# Patient Record
Sex: FEMALE | Race: WHITE
[De-identification: names, ages, dates, MRNs, and addresses within clinical notes are randomized per-mention and may not be internally consistent; named-entity substitution may affect disease eponyms.]

---

## 2017-01-27 ENCOUNTER — HOSPITAL ENCOUNTER (OUTPATIENT)
Dept: HOSPITAL 53 - M WHC | Age: 72
End: 2017-01-27
Attending: NURSE PRACTITIONER
Payer: MEDICARE

## 2017-01-27 DIAGNOSIS — Z12.31: Primary | ICD-10-CM

## 2017-01-27 NOTE — REPMRS
Patient History

The patient states she had a clinical breast exam in 

Patient is postmenopausal and has history of ovarian cancer at 

age 69.

No known family history of cancer.

Benign excisional biopsy of the left breast, 1982.

Took estrogen for 1 year.

 

Digital Woman Screen Mammo: January 27, 2017 - Exam #: 

[id number]

Bilateral CC and MLO view(s) were taken.

 

Technologist: Zita Barlow, Technologist

Prior study comparison: January 20, 2016, digital woman screen 

mammo performed at [facility].  January 19, 2015, 

digital woman screen mammo performed at [facility].

December 5, 2013, digital woman screen mammo performed at 

[facility].

 

FINDINGS: There are scattered fibroglandular densities.  There 

has been no change in the appearance of the mammogram from the 

prior studies.  There is a mild amount of scattered 

fibroglandular density which is fairly symmetric. There is no 

interval development of dominant mass, architectural distortion, 

or clustered microcalcification suggestive of malignancy.

 

ASSESSMENT: BI-RADS/ACR category 1 mammogram. Negative.

 

Recommendation

Routine screening mammogram in 1 year (for women over age 40).

This mammogram was interpreted with the aid of an FDA-approved 

computer-aided dectection system.

 

Electronically Signed By: Christiano Post MD 01/27/17 [id number]

## 2017-03-13 ENCOUNTER — HOSPITAL ENCOUNTER (OUTPATIENT)
Dept: HOSPITAL 53 - M LAB | Age: 72
End: 2017-03-13
Attending: OBSTETRICS & GYNECOLOGY
Payer: MEDICARE

## 2017-03-13 DIAGNOSIS — C56.1: Primary | ICD-10-CM

## 2017-05-17 ENCOUNTER — HOSPITAL ENCOUNTER (OUTPATIENT)
Dept: HOSPITAL 53 - M LAB REF | Age: 72
End: 2017-05-17
Attending: INTERNAL MEDICINE
Payer: MEDICARE

## 2017-05-17 DIAGNOSIS — D50.9: Primary | ICD-10-CM

## 2017-05-18 LAB
FERRITIN SERPL-MCNC: 33 NG/ML (ref 8–252)
IRON SATN MFR SERPL: 13.4 % (ref 13.2–37.4)
TIBC SERPL-MCNC: 373 UG/DL (ref 250–450)

## 2017-06-09 ENCOUNTER — HOSPITAL ENCOUNTER (OUTPATIENT)
Dept: HOSPITAL 53 - M LAB | Age: 72
End: 2017-06-09
Attending: PHYSICIAN ASSISTANT
Payer: MEDICARE

## 2017-06-09 DIAGNOSIS — C56.1: Primary | ICD-10-CM

## 2017-06-28 ENCOUNTER — HOSPITAL ENCOUNTER (EMERGENCY)
Dept: HOSPITAL 53 - M ED | Age: 72
LOS: 1 days | Discharge: HOME | End: 2017-06-29
Payer: MEDICARE

## 2017-06-28 VITALS — DIASTOLIC BLOOD PRESSURE: 72 MMHG | SYSTOLIC BLOOD PRESSURE: 142 MMHG

## 2017-06-28 VITALS — WEIGHT: 168.34 LBS | HEIGHT: 68 IN | BODY MASS INDEX: 25.51 KG/M2
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
## 2017-06-28 DIAGNOSIS — S00.83XA: Primary | ICD-10-CM

## 2017-06-28 DIAGNOSIS — Z88.8: ICD-10-CM

## 2017-06-28 DIAGNOSIS — Z85.43: ICD-10-CM

## 2017-06-28 DIAGNOSIS — M51.36: ICD-10-CM
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
## 2017-06-28 DIAGNOSIS — Z79.899: ICD-10-CM

## 2017-06-28 DIAGNOSIS — S33.5XXA: ICD-10-CM

## 2017-06-28 DIAGNOSIS — M43.16: ICD-10-CM

## 2017-06-28 DIAGNOSIS — M47.817: ICD-10-CM

## 2017-06-28 DIAGNOSIS — S23.3XXA: ICD-10-CM

## 2017-06-28 DIAGNOSIS — I10: ICD-10-CM
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
## 2017-06-28 DIAGNOSIS — Z88.5: ICD-10-CM

## 2017-06-28 DIAGNOSIS — Z90.79: ICD-10-CM

## 2017-06-28 NOTE — REPUSA
CT of the facial bones without contrast

Clinical history: Pain, injury.

Technique: Multiple axial CT images were obtained through the facial bones and paranasal sinuses util
izing 3 mm axial slices without administration of contrast. Coronal and sagittal reconstructions were
 also obtained.

Findings: There are  postoperative changes in the right osteomeatal complex and right maxillary sinus
.. Chronic mucosal changes are seen in this region.. The other paranasal sinuses are grossly unremark
able. The nasal septum is deviated leftward. The visualized mastoid air cells are clear. The osseous 
structures do not demonstrate any acute abnormalities. The superficial soft tissues are within normal
 limits.

Impression: Chronic postoperative changes in the right maxillary sinus.  No acute fractures or trauma
tic injury appreciated.

     Electronically signed by HARRY LANTIGUA MD on 06/28/2017 11:44:39 PM ET

## 2017-06-28 NOTE — REPUSA
CT of the thoracic spine without contrast

Clinical history: Pain.

Technique: Multiple axial CT images were obtained through the thoracic spine without administration o
f contrast. Coronal and sagittal 3-D reconstructed images were also obtained.

Findings:

The vertebral bodies are in satisfactory positioning and alignment. No fractures or dislocations are 
demonstrated. Intervertebral disc spaces are well-maintained. There is no evidence of facet subluxati
on. The neural foramen appear grossly patent. The spinal canal demonstrates normal caliber and contou
r without evidence of spinal stenosis. The surrounding soft tissues are within normal limits.

Impression: No acute traumatic injury.

     Electronically signed by HARRY LANTIGUA MD on 06/28/2017 11:45:28 PM ET

## 2017-06-28 NOTE — REPUSA
CT of the lumbar spine without contrast

Clinical history: Pain.

Technique: Multiple axial CT images were obtained through the lumbar spine without administration of 
contrast. Coronal and sagittal 3-D reconstructed images were also obtained.

Findings:

The lumbar vertebral bodies are in satisfactory positioning.. There is a 5 mm anterior spondylolisthe
sis of L4 upon L5. Severe degenerative disc disease is noted at L3/L4 and L4/L5. No fractures or disl
ocations are demonstrated. There is no evidence of facet subluxation. Moderate facet arthropathy is s
een bilaterally, most severe at L5/S1. The neural foramen appear grossly patent. The spinal canal dem
onstrates normal caliber and contour without evidence of spinal stenosis. The surrounding soft tissue
s are within normal limits.

Impression:

1. No acute fracture or traumatic injury.

2. Moderately severe degenerative disc disease at L3/L4 and L4/L5.

3. Grade 1anterior spondylolisthesis of L4 upon L5.

4. Moderate facet arthropathy, most severe at L5/S1.

     Electronically signed by HARRY LANTIGUA MD on 06/28/2017 11:47:09 PM ET

## 2017-09-13 ENCOUNTER — HOSPITAL ENCOUNTER (OUTPATIENT)
Dept: HOSPITAL 53 - M LAB | Age: 72
End: 2017-09-13
Attending: PHYSICIAN ASSISTANT
Payer: MEDICARE

## 2017-09-13 DIAGNOSIS — C56.1: Primary | ICD-10-CM

## 2017-10-06 ENCOUNTER — HOSPITAL ENCOUNTER (OUTPATIENT)
Dept: HOSPITAL 53 - M LABNEURO | Age: 72
End: 2017-10-06
Attending: PSYCHIATRY & NEUROLOGY
Payer: MEDICARE

## 2017-10-06 DIAGNOSIS — I10: ICD-10-CM

## 2017-10-06 DIAGNOSIS — E11.9: Primary | ICD-10-CM

## 2017-10-06 LAB
BUN SERPL-MCNC: 23 MG/DL (ref 7–18)
CREAT SERPL-MCNC: 0.72 MG/DL (ref 0.55–1.02)
GFR SERPL CREATININE-BSD FRML MDRD: > 60 ML/MIN/{1.73_M2} (ref 39–?)

## 2018-01-26 ENCOUNTER — HOSPITAL ENCOUNTER (OUTPATIENT)
Dept: HOSPITAL 53 - M WHC | Age: 73
End: 2018-01-26
Attending: NURSE PRACTITIONER
Payer: MEDICARE

## 2018-01-26 DIAGNOSIS — Z12.31: Primary | ICD-10-CM

## 2018-01-26 PROCEDURE — 77067 SCR MAMMO BI INCL CAD: CPT

## 2018-02-16 ENCOUNTER — HOSPITAL ENCOUNTER (OUTPATIENT)
Dept: HOSPITAL 53 - M LAB | Age: 73
End: 2018-02-16
Attending: ORTHOPAEDIC SURGERY
Payer: MEDICARE

## 2018-02-16 DIAGNOSIS — Z86.39: ICD-10-CM

## 2018-02-16 DIAGNOSIS — M17.11: ICD-10-CM

## 2018-02-16 DIAGNOSIS — D63.8: ICD-10-CM

## 2018-02-16 DIAGNOSIS — Z01.812: Primary | ICD-10-CM

## 2018-02-16 DIAGNOSIS — M25.561: ICD-10-CM

## 2018-02-16 LAB
ALBUMIN: 3.4 GM/DL (ref 3.2–5.2)
BASO #: 0 10^3/UL (ref 0–0.2)
BASO %: 0.7 % (ref 0–1)
EOS #: 0.3 10^3/UL (ref 0–0.5)
EOSINOPHIL NFR BLD AUTO: 5.6 % (ref 0–3)
EST. AVERAGE GLUCOSE BLD GHB EST-MCNC: 117 MG/DL (ref 60–110)
FERRITIN: 34 NG/ML (ref 8–252)
HEMATOCRIT: 35.4 % (ref 36–47)
HEMOGLOBIN: 11.3 G/DL (ref 12–16)
IMMATURE GRANULOCYTE %: 0.3 % (ref 0–3)
IRON (FE): 59 UG/DL (ref 50–170)
IRON SATN MFR SERPL: 18.9 % (ref 13.2–45)
LYMPH #: 1.5 10^3/UL (ref 1.5–4.5)
LYMPH %: 24.7 % (ref 24–44)
MEAN CORPUSCULAR HEMOGLOBIN: 29.9 PG (ref 27–33)
MEAN CORPUSCULAR HGB CONC: 31.9 G/DL (ref 32–36.5)
MEAN CORPUSCULAR VOLUME: 93.7 FL (ref 80–96)
MONO #: 0.5 10^3/UL (ref 0–0.8)
MONO %: 7.7 % (ref 0–5)
NEUTROPHILS #: 3.7 10^3/UL (ref 1.8–7.7)
NEUTROPHILS %: 61 % (ref 36–66)
NRBC BLD AUTO-RTO: 0 % (ref 0–0)
PLATELET COUNT, AUTOMATED: 241 10^3/UL (ref 150–450)
RED BLOOD COUNT: 3.78 10^6/UL (ref 4–5.4)
RED CELL DISTRIBUTION WIDTH: 12.9 % (ref 11.5–14.5)
TOTAL IRON BINDING CAPACITY: 312 UG/DL (ref 250–450)
WHITE BLOOD COUNT: 6.1 10^3/UL (ref 4–10)

## 2018-02-16 PROCEDURE — 82040 ASSAY OF SERUM ALBUMIN: CPT

## 2018-03-05 ENCOUNTER — HOSPITAL ENCOUNTER (EMERGENCY)
Dept: HOSPITAL 53 - M ED | Age: 73
Discharge: HOME | End: 2018-03-05
Payer: MEDICARE

## 2018-03-05 DIAGNOSIS — Z79.899: ICD-10-CM
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
## 2018-03-05 DIAGNOSIS — F33.9: ICD-10-CM
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
## 2018-03-05 DIAGNOSIS — I10: ICD-10-CM

## 2018-03-05 DIAGNOSIS — Z88.5: ICD-10-CM

## 2018-03-05 DIAGNOSIS — S52.002A: Primary | ICD-10-CM

## 2018-03-05 DIAGNOSIS — Z79.84: ICD-10-CM

## 2018-03-05 DIAGNOSIS — J45.909: ICD-10-CM

## 2018-03-05 DIAGNOSIS — Z88.8: ICD-10-CM

## 2018-03-05 PROCEDURE — 73080 X-RAY EXAM OF ELBOW: CPT

## 2018-03-20 ENCOUNTER — HOSPITAL ENCOUNTER (OUTPATIENT)
Dept: HOSPITAL 53 - M LAB REF | Age: 73
End: 2018-03-20
Attending: ORTHOPAEDIC SURGERY
Payer: MEDICARE

## 2018-03-20 DIAGNOSIS — T81.4XXA: Primary | ICD-10-CM

## 2018-03-20 PROCEDURE — 87116 MYCOBACTERIA CULTURE: CPT

## 2018-04-13 ENCOUNTER — HOSPITAL ENCOUNTER (OUTPATIENT)
Dept: HOSPITAL 53 - M LABDRAW1 | Age: 73
End: 2018-04-13
Attending: ORTHOPAEDIC SURGERY
Payer: MEDICARE

## 2018-04-13 ENCOUNTER — HOSPITAL ENCOUNTER (OUTPATIENT)
Dept: HOSPITAL 53 - M LABDRAW1 | Age: 73
End: 2018-04-13
Attending: PHYSICIAN ASSISTANT
Payer: MEDICARE
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
## 2018-04-13 DIAGNOSIS — S52.222D: Primary | ICD-10-CM
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
## 2018-04-13 DIAGNOSIS — Z01.812: ICD-10-CM
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
## 2018-04-13 LAB
ANION GAP: 7 MEQ/L (ref 8–16)
BASO #: 0.1 10^3/UL (ref 0–0.2)
BASO %: 0.7 % (ref 0–1)
BLOOD UREA NITROGEN: 21 MG/DL (ref 7–18)
CALCIUM LEVEL: 8.9 MG/DL (ref 8.8–10.2)
CARBON DIOXIDE LEVEL: 29 MEQ/L (ref 21–32)
CHLORIDE LEVEL: 107 MEQ/L (ref 98–107)
CREATININE FOR GFR: 0.87 MG/DL (ref 0.55–1.3)
CRP SERPL-MCNC: 0.37 MG/DL (ref 0–0.3)
EOS #: 0.2 10^3/UL (ref 0–0.5)
EOSINOPHIL NFR BLD AUTO: 2 % (ref 0–3)
ERYTHROCYTE SEDIMENTATION RATE: 25 MM/HR (ref 0–30)
GFR SERPL CREATININE-BSD FRML MDRD: > 60 ML/MIN/{1.73_M2} (ref 39–?)
GLUCOSE, FASTING: 115 MG/DL (ref 70–100)
HEMATOCRIT: 40.5 % (ref 36–47)
HEMOGLOBIN: 13.2 G/DL (ref 12–15.5)
IMMATURE GRANULOCYTE %: 0.3 % (ref 0–3)
LYMPH #: 1.6 10^3/UL (ref 1.5–4.5)
LYMPH %: 18.2 % (ref 24–44)
MEAN CORPUSCULAR HEMOGLOBIN: 30.1 PG (ref 27–33)
MEAN CORPUSCULAR HGB CONC: 32.6 G/DL (ref 32–36.5)
MEAN CORPUSCULAR VOLUME: 92.3 FL (ref 80–96)
MONO #: 0.7 10^3/UL (ref 0–0.8)
MONO %: 7.6 % (ref 0–5)
NEUTROPHILS #: 6.4 10^3/UL (ref 1.8–7.7)
NEUTROPHILS %: 71.2 % (ref 36–66)
NRBC BLD AUTO-RTO: 0 % (ref 0–0)
PLATELET COUNT, AUTOMATED: 327 10^3/UL (ref 150–450)
POTASSIUM SERUM: 3.9 MEQ/L (ref 3.5–5.1)
RED BLOOD COUNT: 4.39 10^6/UL (ref 4–5.4)
RED CELL DISTRIBUTION WIDTH: 12.5 % (ref 11.5–14.5)
SODIUM LEVEL: 143 MEQ/L (ref 136–145)
WHITE BLOOD COUNT: 9 10^3/UL (ref 4–10)

## 2018-04-13 PROCEDURE — 80048 BASIC METABOLIC PNL TOTAL CA: CPT

## 2018-04-13 PROCEDURE — 86140 C-REACTIVE PROTEIN: CPT

## 2019-01-04 NOTE — REPMRS
Patient History

The patient states she had a clinical breast exam in 07/2018.

Patient is postmenopausal, has history of ovarian cancer at age 

69, and had previous chemotherapy at age 69.

No known family history of cancer.

Benign excisional biopsy of the left breast, 1982.

Taking estrogen for 3 years.

 

Digital Woman Screen Mammo: January 4, 2019 - Exam #: 

[id number]

Bilateral CC and MLO view(s) were taken.

 

Technologist: Charu Bolden, Technologist

Prior study comparison: January 26, 2018, digital woman screen 

mammo performed at [facility].  January 27, 2017, 

digital woman screen mammo performed at [facility].

January 20, 2016, digital woman screen mammo performed at 

[facility].

 

FINDINGS: There are scattered fibroglandular densities.  There 

has been no change in the appearance of the mammogram from the 

prior studies.  There is a mild amount of scattered 

fibroglandular density which is fairly symmetric. There is no 

interval development of dominant mass, architectural distortion, 

or clustered microcalcification suggestive of malignancy.

3-D tomosynthesis shows no additional findings.

 

Assessment: BI-RADS/ACR category 1 mammogram. Negative.

 

Recommendation

Routine screening mammogram of both breasts in 1 year (for women 

over age 40).

 

This patient's Lifetime Breast Cancer RIsk is estimated at 5.0 %.

This mammogram was interpreted with the aid of an FDA-approved 

computer-aided dectection system.

 

Electronically Signed By: Christiano Post MD 01/04/19 [id number]

## 2020-02-03 NOTE — REP
CT study bilateral lower extremities without contrast:

 

History:  Assess leg length.

 

Technique:  Helical scanning is acquired and 3 mm axial images are reformatted.

Coronal and sagittal MPR images are generated.  Scan range is from the superior

pelvis to the ankles.

 

CT findings:  Digital preliminary  radiograph demonstrates a right hip

arthroplasty, a right knee arthroplasty, and an old tib-fib fracture on the right

as well.  There are three nodular opacities projecting in the peritrochanteric

region on the  view on the right which are related to clothing artifact.

 

The  view is utilized to measure the femurs and tibias bilaterally.  It

should be noted that the knee and hip arthroplasties on the right may affect

these measurements to some degree.  The length of the left femur is 45.6 cm.  The

length of the right femur is 46.7 cm.  The length of the left tibia measures 37.2

cm and that of the right 34.8 cm.

 

On axial and MPR images, the prosthetic right hip and knee arthroplasties appear

in good position.  There is no CT evidence to suggest loosening.  There is

bilateral greater trochanteric spurring.  There is moderate osteoarthritis of the

medial and lateral femorotibial compartments of the left knee.  There is moderate

patellofemoral spurring as well.  There is some quadriceps tendon insertion site

spurring on the left patellar upper pole.  No bony destructive lesion is

appreciated on either side.  There is greater trochanteric spurring on the left

with some adjacent soft tissue calcification.

 

Incidental note is made of a pelvic floor support device in the vagina.

 

At the old tib-fib fracture, there is osseous bridging between the adjacent

tibial and fibular fractures.  There is no evidence of nonunion of the tibial or

fibular fracture.  There is bilateral ankle osteoarthritis, right more prominent

than left.

 

Impression:

 

Status post right hip and knee arthroplasties.  Left knee osteoarthritis.  Status

post right tib-fib fractures with healing and osseous bridging.  There is some

right hip ankle osteoarthritis as well.  Leg length measurements as above.

 

 

Electronically Signed by

Donovan Post MD 02/03/2020 11:49 A

## 2021-01-21 NOTE — REPMRS
Patient History

The patient states she has not had a clinical breast exam in over

a year.

Patient is postmenopausal, has history of ovarian cancer at age 

69, and had previous chemotherapy at age 69.

Family history of endometrial cancer at age 55 in mother.

Benign excisional biopsy of the left breast, 1982.

Took estrogen for 3 years.

 

3D TOMOSYNTHESIS WAS PERFORMED.

 

The [facility] lifetime risk for breast cancer is 4.4%.

 

[place] breast density b.

 

Digital Woman Screen Mammo: January 21, 2021 - Exam #: 

[id number]

Bilateral CC and MLO view(s) were taken.

 

Technologist: Ilana Ambrose, Annabellaologist

Prior study comparison: January 16, 2020, bilateral digital woman

screen mammo performed at [facility] 

[facility].  January 4, 2019, bilateral digital woman screen 

mammo performed at [facility].

 

FINDINGS: There are scattered fibroglandular densities.  There 

has been no change in the appearance of the mammogram from the 

prior studies.  There is a mild amount of residual fibroglandular

tissue which is fairly symmetric. There is no interval 

development of dominant mass, architectural distortion, or 

clustered microcalcification suggestive of malignancy.

 

Assessment: BI-RADS/ACR category 1 mammogram. Negative Mammogram.

 

Recommendation

Routine screening mammogram in 1 year (for women over age 40).

This mammogram was interpreted with the aid of an FDA-approved 

computer-aided dectection system.

 

Electronically Signed By: Ez Acosta MD 01/21/21 [id number]

## 2021-02-25 NOTE — REP
INDICATION:

HALLUX VALGUS (ACQUIRED), RIGHT FOOT/LABS



COMPARISON:

07/24/2016.



TECHNIQUE:

PA/Lateral



FINDINGS:

Lungs: Clear, no infiltrate.

Heart: Normal in size.

Mediastinum: Mediastinal silhouette unremarkable.

Pleural angles: Unremarkable..

Bones and soft tissues: There degenerative changes of the spine without compression

deformity.  There is metallic fixation in the cervical spine.



IMPRESSION:

No acute pulmonary disease.





<Electronically signed by Ez Acosta > 02/25/21 [id number]

## 2021-02-26 NOTE — ECGEPIP
[facility]

                                       

                                       Test Date:    2021

Pat Name:     DAYANARA KIM             Department:   

Patient ID:   [id number]                 Room:         -

Gender:       Female                   Technician:   DILIP

:          1945               Requested By: Al Martinez 

Order Number: [id number]     Reading MD:   Godwin Tabares

                                 Measurements

Intervals                              Axis          

Rate:         67                       P:            71

IL:           144                      QRS:          16

QRSD:         104                      T:            44

QT:           428                                    

QTc:          452                                    

                           Interpretive Statements

Normal sinus rhythm

Last tracing on 16, 15:02.  No significant changes.

Electronically Signed on 2021 0:53:39 EST by Godwin Tabares

## 2021-03-19 NOTE — REP
INDICATION:

POST OP.



COMPARISON:

None.



TECHNIQUE:

Four views obtained through overlying cast material.



FINDINGS:

Four views of the right foot demonstrate operative pinning of the IP and MTP joints of

the 2nd, 3rd, and 4th digits.  Distal 1st metatarsal osteotomy has been performed with

a single metallic screw in place.  Resection of the distal ends of the proximal

phalanges of the 2nd through 5th toes is observed.  There is mild plantar spurring.

Overlying splint and dressing material.





IMPRESSION:

Postoperative changes as noted above..







<Electronically signed by Christiano Post > 03/19/21 [id number]

## 2021-03-20 NOTE — RO
OPERATIVE NOTE



DATE OF OPERATION: 03/19/2021



PREOPERATIVE DIAGNOSIS: Hallux valgus metatarsus primus varus deformity, right

foot, hammertoe deformity, second toe, right foot, hammertoe deformity, third

toe, right foot, hammertoe deformity, fourth toe, right foot, hammertoe

deformity, fifth toe, right foot, extensor contracture, second

metatarsophalangeal joint, right foot, extensor contracture, third

metatarsophalangeal joint, right foot, 



POSTOPERATIVE DIAGNOSIS: Hallux valgus metatarsus primus varus deformity, right

foot, hammertoe deformity, second toe, right foot, hammertoe deformity, third

toe, right foot, hammertoe deformity, fourth toe, right foot, hammertoe

deformity, fifth toe, right foot, extensor contracture, second

metatarsophalangeal joint, right foot, extensor contracture, third

metatarsophalangeal joint, right foot, 



PROCEDURE: Chai bunionetomy right foot

2. Proximal interphalangeal joint arthroplasty with external wire fixation, 
0.045

x1, second toe, right foot.

2. Proximal interphalangeal joint arthroplasty with external wire fixation, 
0.045

x1, third toe, right foot.

3. Proximal interphalangeal joint arthroplasty with external wire fixation, 
0.045

x1, fourth toe, right foot.

4. Proximal interphalangeal joint arthroplasty 5th toe, right foot.

5. Extensor capsulotomy 2 metatarsal phalangeal joint capsulotomy right foot

6. Extensor capsulotomy 3 metatarsal phalangeal joint capsulotomy right foot





SURGEON: Al Martinez DPM



ASSISTANT: 



ANESTHESIA: Local MAC.



IRRIGATION: Dilute bacitracin, neomycin and polymyxin B solution.



HEMOSTASIS: Ankle pneumatic tourniquet at 225 mmHg for 79 minutes.



HARDWARE UTILIZED: Arthrex headless 3.0 x 24 mm headless compression screw and

0.045 wire x3.



DESCRIPTION OF OPERATION: On 3/19/2021, this 75-year-old white female was taken

from her hospital room to the operating room and placed on the operating table

in supine position. Following the induction of IV sedation and local and

regional anesthesia, the right lower extremity was prepped and draped in the

usual aseptic manner. Attention was directed to the patient's right foot. There

was noted to be a hallux valgus deformity. Chai bunionectomy with screw 
fixation.

At this time, a 5 cm incision was placed over the first metatarsophalangeal 
joint medial to the extensor tendon.

The incision was deepened in the subcutaneous tissues and all crossing venous

tributaries were identified, underscored, clamped, cut, ligated or

electrocoagulated as necessary A linear capsulotomy was performed in the same

plane as the original skin incision. The capsule and periosteal structures were

then dissected free in one continuous layer, dorsally, medially and laterally,

thus creating a capsular periosteal type envelope. This brought into view the

hypertrophied medial eminence of the first metatarsal which was osteotomized

from distal to proximal extending medial to the sesamoidal groove.  Attention

was then directed into the first intermetatarsal space where dissection was

carried down to the level of the conjoined tendon. This was sharply dissected

free from the fibular sesamoid. Attention was directed to the medial surface of

the first metatarsal where a V-shaped osteotomy was performed with a long

plantar and short dorsal wing. Upon creation of this osteotomy, the capital

fragment was transposed approximately 30% of the width of the shaft of the

first metatarsal and fixated with an Arthrex headless 3.0 x 24 mm compression

screw. The redundant cortical spike was then osteotomized from dorsal to

plantar through-and-through. The medial surface was rasped to a smooth contour.

The wound was flushed with copious amounts of dilute bacitracin, neomycin and

polymyxin B solution. Attention was then directed towards closure with the

capsular structures coapted and maintained utilizing 2-0 Monocryl in a simple

interrupted type fashion. The subcutaneous tissues utilizing 3-0 Monocryl in a

simple interrupted type fashion. The skin incision was coapted and maintained

utilizing 3-0 nylon in a simple interrupted fashion. Attention was then

directed to the patient's second toe of the right foot where the following

procedure was performed.



Proximal interphalangeal joint arthroplasty with external wire fixation, 0.045

x1, second toe, right foot. Attention was directed to the patient's second toe

of the right foot where there was noted to be a hammertoe deformity. At this

time, a 3 cm incision was placed over the proximal interphalangeal joint of the

second toe. The incision was deepened through subcutaneous tissues and all

crossing venous tributaries were identified, underscored, clamped, cut, ligated

and electrocoagulated as necessary. A transverse tenotomy and capsulotomy was

then performed at the level of the proximal interphalangeal joint. The extensor

expansion and reich were then released with a dissection scissor. Utilizing a

power saw, an osteotomy was performed through the anatomical neck of the

proximal phalanx from dorsal to plantar, medial to lateral through-and-through.

This was extirpated from the wound. The cartilage at the base of the middle

phalanx was then osteotomized from dorsal to plantar through-and-through the

extirpated from the wound. There was still noted to be an extensor contracture

of the second metatarsophalangeal joint as well as constriction on the lateral

surface of the joint. Therefore, the following procedure was performed.



Extensor capsulotomy, second metatarsophalangeal joint, right foot. Attention

was directed to the patient's metatarsophalangeal joint where a stab incision

was placed over the second metatarsophalangeal joint and dissection was carried

down to the metatarsophalangeal joint capsule. The extensor tendon was freed

and delivered proximal to the capsule and a dorsal and lateral release was

performed at the metatarsophalangeal joint, straightening the second toe.

Attention was then directed back to the toe and utilizing a 0.045 wire, a wire

was driven through the middle and distal phalanges, through the proximal

phalanx and across the metatarsophalangeal joint with the toe held in a

slightly overcorrected position and a plantarflexed and slight medial position.

The wire was bent and a protective ball was placed on the distal end of the

wire. The wound was flushed with copious amounts of dilute bacitracin, neomycin

and polymyxin B solution. Attention was directed to the capsulotomy incision

which was repaired with 3-0 nylon in simple interrupted type fashion. The

extensor tendon was coapted and repaired with 4-0 braided nylon suture in a

four-stranded Mcmahon repair. The skin was coapted and maintained with 3-0

nylon in a simple interrupted type fashion. Attention was then directed to the

patient's third toe of the right foot where the following procedure was

performed.



Proximal interphalangeal joint arthroplasty with external wire fixation, third

toe, right foot. Attention was directed to the patient's third toe of the right

foot where the procedure performed on the second toe was now performed on the

third toe without variation or deletion, the only exception being that of

anatomical location. Attention was then directed to the third

metatarsophalangeal joint where there was noted to be extensor contracture. At

this time, the procedure performed on the second metatarsophalangeal joint was

now performed on the third metatarsophalangeal joint without variation or

deletion, the only exception being that of anatomical location. Attention was

then directed to the fourth toe of the right foot where the following procedure

was performed. 



Proximal interphalangeal joint arthroplasty with external wire fixation, fourth

toe, right foot. Attention was directed the patient's fourth toe of the right

foot where the procedure performed on the second toe was now performed on the

fourth toe within variation or deletion, the only exception being that of

anatomical location. Attention was then directed to the patient's fifth toe of

the right foot where the following procedure was performed.



Proximal interphalangeal joint arthroplasty, fifth toe, right foot. Attention

was directed the patient's fifth toe of the right foot where there was noted to

be a hammertoe deformity. At this time, the procedure performed on the second

toe was now performed on the fifth toe with the following variations. No

external wire was utilized to stabilize the fifth toe. Attention was then

directed towards bandaging where a sterile compressive bandage was applied

consisting of Adaptic, 4x4s, 4x4 splints, Gordon, Kerlix and Coban.  The ankle

pneumatic tourniquet was deflated and instantaneous capillary filling time was

noted to the distal one through five of the patient's right foot. A fiberglass

boot cast was then applied the patient's right foot. The patient apparently

tolerated the surgical procedure well and was taken from the OR to the recovery

room for further monitoring by the anesthesia department. Postoperative

instructions were given upon discharge. 

URIEL

## 2021-04-15 NOTE — REP
INDICATION:

RT LEG PAIN SWELLING ? DVT



COMPARISON:

None.



TECHNIQUE:

Real time compression and duplex Doppler interrogation of the right lower extremity

deep venous system is performed.



FINDINGS:

The right common femoral, superficial femoral and popliteal veins are fully

compressible with transducer pressure and demonstrate normal spontaneous and phasic

flow, without evidence of deep venous thrombosis.



IMPRESSION:

No evidence of deep venous thrombosis of the right lower extremity femoral popliteal

venous system.





<Electronically signed by Ez Acosta > 04/15/21 [id number]

## 2021-09-25 NOTE — REPVR
PROCEDURE INFORMATION: 

Exam: CT Right Lower Extremity Without Contrast; Thigh 

Exam date and time: 9/25/2021 5:39 PM 

Age: 76 years old 

Clinical indication: Condition or disease; Other: FX; Additional info: Right 

distal femur fracture; Further evaluate 



TECHNIQUE: 

Imaging protocol: CT of the Right lower extremity without contrast was 

performed. Exam focused on the thigh. 

Radiation optimization: All CT scans at this facility use at least one of these 

dose optimization techniques: automated exposure control; mA and/or kV 

adjustment per patient size (includes targeted exams where dose is matched to 

clinical indication); or iterative reconstruction. 



COMPARISON: 

CR Femur 9/25/2021 2:30 PM 



FINDINGS: 

Bones/joints: There is a comminuted periprosthetic supracondylar fracture of 

the distal femur. There is motion artifact that degrades image quality. Femoral 

and Tibial component of the prosthesis are intact. 

Soft tissues: Hemorrhage infiltrates into the surrounding soft tissues at the 

level of the fracture. 



IMPRESSION: 

Comminuted periprosthetic supracondylar femoral fracture with surrounding 

hemorrhage within the adjacent soft tissues



Electronically signed by: Tammi Serna On 09/25/2021  18:58:54 PM

## 2021-09-25 NOTE — REP
INDICATION:

right femur pain.



COMPARISON:

None.



TECHNIQUE:

Three views of the right femur were obtained.



FINDINGS:

There is a right total knee arthroplasty.  There is a comminuted fracture of the

femoral shaft just proximal to the superior margin of the femoral component of the

arthroplasty.  The tibial component is intact.



There is a right total hip arthroplasty.  The prosthetic femoral head is located in

the prostatic acetabula.  The distal end of the femoral component is well

demonstrated.  There is no evidence of loosening or fracture.



IMPRESSION:

1.  Comminuted fracture of the right femur just proximal to the femoral component of a

total knee arthroplasty.

2.  Normal right total hip arthroplasty.





<Electronically signed by Sid Joy > 09/25/21 0156

## 2021-09-25 NOTE — REP
INDICATION:

right knee pain; fall.



COMPARISON:

None.



TECHNIQUE:

Three views of the right knee were obtained.



FINDINGS:

There is a right total knee arthroplasty.  At the superior margin of the femoral

component there has been a comminuted fracture of the femur.  There is severe

angulation of the distal fracture fragment posteriorly.  The tibial component appears

intact.



IMPRESSION:

Comminuted fracture of the distal femoral shaft just proximal to the femoral component

of a total knee arthroplasty.





<Electronically signed by Sid Joy > 09/25/21 [id number]

## 2021-09-26 NOTE — ER
ER  CONSULTATION



DATE: 09/25/2021



TIME: 6 p.m.



CONSULTING SERVICE: Orthopedic surgery.



CONSULTING PHYSICIAN: Juan Salmon MD



HISTORY OF PRESENT ILLNESS:  This is a 76-year-old female who sustained a right

distal femur periprosthetic fracture after her right leg gave out. The patient

had previous right total hip and right total knee arthroplasties performed at

[facility]. Dr. Brasher performed the right total knee arthroplasty. After

sustaining the ground level fall and distal femur periprosthetic fracture she

presented to [facility] for further evaluation and treatment.

Orthopedic surgery was consulted for further evaluation and treatment.



MEDICAL HISTORY:  Diabetes, hypertension, ovarian cancer.



SURGICAL HISTORY:  Right total hip arthroplasty, right total knee arthroplasty,

hysterectomy, left ulna open reduction and internal fixation.



SOCIAL HISTORY:  She is a nondrinker, nonsmoker, non IV drug user. Lives at

home with her .



ALLERGIES TO MEDICATIONS:  ACETAMINOPHEN.



CURRENT MEDICATIONS:  Patient reported medications for hypertension, asthma,

antidepressants. The patient did not identify with the names of medications as

she left her list at home.



REVIEW OF SYSTEMS: 14 point review of systems negative unless otherwise

described in HPI above.



PHYSICAL EXAMINATION:  

GENERAL: Alert and oriented to person, time and place.

RIGHT LOWER EXTREMITY: She had an obvious deformity about the right distal

femur with ecchymosis and edema. I was able to visualize this through a

posterior slab placed by the ER provider. She had intact light touch to the

deep and superficial peroneal, sural, saphenous and tibial nerve distributions.

She had 5/5 motor strength in EHL, FHL, tibialis anterior, gastrocnemius and

peroneal musculature. Brisk capillary refill to the digits of her right foot,

under 2 second capillary refill. Palpable dorsalis pedis and posterior tibial

arterial pulse.



RADIOGRAPHS: Demonstrated distal femur periprosthetic fracture that extended

distal to the proximal edge of the distal femur component of the total knee

arthroplasty. Component appeared to be well fixed. However, the fracture

extended into the distal femoral component and it would be difficult to

determine with confidence if the implant is stable without surgical

interrogation.



CT scan confirmed knee radiographs demonstrating that the fracture was distal

to the proximal edge of the distal femur implant. There were no other

abnormalities appreciated other than the right total knee arthroplasty, right

hip arthroplasty and fracture at the distal femur propagating distal to the

most proximal aspect of the distal femur implant.



IMPRESSION:  76-year-old female with right distal femur periprosthetic fracture.



PLAN:  At this point in time, given the location of the distal femur fracture

with extension past the proximal edge of the distal femoral component of the

total knee arthroplasty, I feel she is likely going to require distal femur

replacement. Given the fact that [facility] does not have the

capability for the aforementioned procedure I feel that transferring her to

[facility] is the best option at this point. Dr. Brasher performed her total

knee arthroplasty in 2018 and is a resident physician at [facility] who

would be capable of caring for this injury. At this point in time the patient's

right lower extremity is neurovascularly intact and we will proceed with

transferring the patient to [facility] or any other hospital with the

aforementioned capabilities as soon as possible.